# Patient Record
Sex: FEMALE | Race: WHITE | NOT HISPANIC OR LATINO | Employment: FULL TIME | URBAN - METROPOLITAN AREA
[De-identification: names, ages, dates, MRNs, and addresses within clinical notes are randomized per-mention and may not be internally consistent; named-entity substitution may affect disease eponyms.]

---

## 2024-04-29 ENCOUNTER — OFFICE VISIT (OUTPATIENT)
Dept: INTERNAL MEDICINE | Age: 39
End: 2024-04-29
Payer: COMMERCIAL

## 2024-04-29 VITALS
WEIGHT: 227.8 LBS | SYSTOLIC BLOOD PRESSURE: 126 MMHG | DIASTOLIC BLOOD PRESSURE: 78 MMHG | BODY MASS INDEX: 36.61 KG/M2 | TEMPERATURE: 98.1 F | HEIGHT: 66 IN | OXYGEN SATURATION: 98 % | HEART RATE: 89 BPM

## 2024-04-29 DIAGNOSIS — A60.00 GENITAL HERPES SIMPLEX, UNSPECIFIED SITE: ICD-10-CM

## 2024-04-29 DIAGNOSIS — Z00.00 ENCOUNTER FOR ANNUAL PHYSICAL EXAM: Primary | ICD-10-CM

## 2024-04-29 DIAGNOSIS — Z11.59 NEED FOR HEPATITIS C SCREENING TEST: ICD-10-CM

## 2024-04-29 PROCEDURE — 99385 PREV VISIT NEW AGE 18-39: CPT | Performed by: NURSE PRACTITIONER

## 2024-04-29 RX ORDER — VALACYCLOVIR HYDROCHLORIDE 500 MG/1
TABLET, FILM COATED ORAL
Qty: 30 TABLET | Refills: 2 | Status: SHIPPED | OUTPATIENT
Start: 2024-04-29

## 2024-04-29 NOTE — PROGRESS NOTES
"[facility] [place]  JOZEF Block      Danielle Larry / 39 y.o. / female  2024      VITALS     Visit Vitals  /78 (BP Location: Left arm, Patient Position: Sitting, Cuff Size: Adult)   Pulse 89   Temp 98.1 °F (36.7 °C) (Temporal)   Ht 167.6 cm (66\")   Wt 103 kg (227 lb 12.8 oz)   LMP 2024 (Approximate)   SpO2 98%   BMI 36.77 kg/m²       BP Readings from Last 3 Encounters:   24 126/78     Wt Readings from Last 3 Encounters:   24 103 kg (227 lb 12.8 oz)      Body mass index is 36.77 kg/m².    MEDICATIONS     Current Outpatient Medications   Medication Sig Dispense Refill    valACYclovir (Valtrex) 500 MG tablet 500 mg twice daily for 3 days for herpes outbreak 30 tablet 2     No current facility-administered medications for this visit.       _____________________________________________________________________________________    CHIEF COMPLAINT     Annual Exam, SKIN TAGS (MOLES), and genital herpes       HISTORY OF PRESENT ILLNESS     Danielle presents for annual health maintenance visit.    Last health maintenance visit: many years ago  General health: good  Lifestyle:  Attempting to lose weight?: Yes   Diet: eats moderately healthy  Exercise: generally stays active (work/exercise)  Tobacco: Never used   Alcohol: occasional/infrequent  Work: Full-time  Reproductive health:  Sexually active?: Yes   Sexual problems?: No problems  Concern for STD?: No    Sees Gynecologist?: No; plans to schedule   Alyssa/Postmenopausal?: No   Domestic abuse concerns: No   Depression Screenin/29/2024    11:17 AM   PHQ-2/PHQ-9 Depression Screening   Little Interest or Pleasure in Doing Things 0-->not at all   Feeling Down, Depressed or Hopeless 0-->not at all   PHQ-9: Brief Depression Severity Measure Score 0         PHQ-2: 0 (Not depressed)   PHQ-9: 0 (Negative screening for depression)    Patient Care Team:  AvilaNick DNP, APRN as PCP - General (Internal Medicine)  Provider, No Known as " PCP - Family Medicine  ______________________________________________________________________    ALLERGIES  Allergies   Allergen Reactions    Penicillins Unknown - Low Severity    Neomycin-Bacitracin Zn-Polymyx Swelling        PFSH:     The following portions of the patient's history were reviewed and updated as appropriate: Allergies / Current Medications / Past Medical History / Surgical History / Social History / Family History    PROBLEM LIST   Patient Active Problem List   Diagnosis    Genital herpes       PAST MEDICAL HISTORY  History reviewed. No pertinent past medical history.    SURGICAL HISTORY  History reviewed. No pertinent surgical history.    SOCIAL HISTORY  Social History     Socioeconomic History    Marital status: Single   Tobacco Use    Smoking status: Never    Smokeless tobacco: Never   Vaping Use    Vaping status: Never Used   Substance and Sexual Activity    Alcohol use: Yes     Alcohol/week: 2.0 standard drinks of alcohol     Types: 2 Glasses of wine per week    Drug use: Never    Sexual activity: Yes     Partners: Female       FAMILY HISTORY  Family History   Problem Relation Age of Onset    Lung cancer Mother     Prostate cancer Father     Alzheimer's disease Maternal Grandfather     Diabetes Maternal Grandfather        IMMUNIZATION HISTORY  Immunization History   Administered Date(s) Administered    COVID-19 (PFIZER) Purple Cap Monovalent 04/29/2021, 05/19/2021       ______________________________________________________________________    REVIEW OF SYSTEMS     Review of Systems   Constitutional: Negative.    HENT: Negative.     Eyes: Negative.    Respiratory: Negative.     Cardiovascular: Negative.    Gastrointestinal: Negative.    Endocrine: Negative.    Genitourinary: Negative.    Musculoskeletal: Negative.    Skin:         Skin tags    Allergic/Immunologic: Negative.    Neurological: Negative.    Hematological: Negative.    Psychiatric/Behavioral: Negative.       PHYSICAL EXAMINATION  "    Physical Exam  Constitutional:       Appearance: Normal appearance.   HENT:      Head: Normocephalic and atraumatic.      Right Ear: Tympanic membrane, ear canal and external ear normal.      Left Ear: Tympanic membrane, ear canal and external ear normal.      Nose: Nose normal.      Mouth/Throat:      Mouth: Mucous membranes are moist.      Pharynx: Oropharynx is clear.   Eyes:      Pupils: Pupils are equal, round, and reactive to light.   Neck:      Thyroid: No thyromegaly.      Vascular: No carotid bruit.   Cardiovascular:      Rate and Rhythm: Normal rate and regular rhythm.      Pulses: Normal pulses.      Heart sounds: Normal heart sounds.   Pulmonary:      Effort: Pulmonary effort is normal.      Breath sounds: Normal breath sounds.   Abdominal:      General: There is no distension.      Palpations: Abdomen is soft.      Tenderness: There is no abdominal tenderness. There is no right CVA tenderness, left CVA tenderness or guarding.   Genitourinary:     Comments: Scheduling with obgyn   Musculoskeletal:         General: Normal range of motion.      Cervical back: Normal range of motion.      Right lower leg: No edema.      Left lower leg: No edema.   Lymphadenopathy:      Cervical: No cervical adenopathy.   Skin:     General: Skin is warm and dry.   Neurological:      Mental Status: She is alert and oriented to person, place, and time.      Cranial Nerves: No cranial nerve deficit.      Motor: No weakness.      Gait: Gait normal.      Deep Tendon Reflexes:      Reflex Scores:       Patellar reflexes are 2+ on the right side and 2+ on the left side.  Psychiatric:         Mood and Affect: Mood normal.         Behavior: Behavior normal.         Thought Content: Thought content normal.         Judgment: Judgment normal.       REVIEWED DATA      Labs:    No results found for: \"NA\", \"K\", \"CALCIUM\", \"AST\", \"ALT\", \"BUN\", \"CREATININE\", \"EGFRIFNONA\", \"EGFRIFAFRI\"    No results found for: \"GLU\", \"HGBA1C\", \"TSH\", " "\"FREET4\"    No results found for: \"LDL\", \"HDL\", \"TRIG\", \"CHOLHDLRATIO\"    No results found for: \"RMMT65NU\"     No results found for: \"WBC\", \"HGB\", \"MCV\", \"PLT\"    Lab Results   Component Value Date    GLUCOSEU Negative 01/24/2016    BLOODU 2+ (A) 01/24/2016    NITRITEU Positive (A) 01/24/2016    LEUKOCYTESUR 3+ (A) 01/24/2016        No results found for: \"HEPCVIRUSABY\"    Imaging:        Medical Tests:        ASSESSMENT & PLAN     ANNUAL WELLNESS EXAM / PHYSICAL     Other medical problems addressed today:  Patient presents to establish care with new provider in office, has not been to a provider plans to establish with OB/GYN that wife also sees for mammogram that will be due next year along with Pap smear.  Works at [employer].    No family history of colon cancer.    Genital herpes with intermittent outbreaks.     Summary/Discussion:     Will get annual labs today  Valtrex as needed for genital herpes  Plans to establish with OB/GYN    Next Appointment with me: Visit date not found    Return in about 1 year (around 4/29/2025) for Annual physical.      HEALTHCARE MAINTENANCE ISSUES     Cancer Screening:  Colon: Initial/Next screening at age: 45  Repeat colon cancer screening: N/A at this time  Breast: Recommended monthly self exams; annual professional exam  Mammogram: N/A at this time  Cervical: Scheduling with OB/GYN  Skin: Monthly self skin examination, annual exam by health professional  Lung: Does not meet criteria for lung cancer screening.   Other:    Screening Labs & Tests:  Lab results reviewed & discussed with the patient or test orders placed today.  EKG:  CV Screening: Lipid panel  DEXA (65+ or postmenopausal with risk factors):   HEP C (If born [birth year], or risk factors): Ordered  Other:     Immunization/Vaccinations (to be given today unless deferred by patient)  Influenza: Patient missed the flu shot but plans to get it this season  Hepatitis A: Recommended here or at pharmacy  Tetanus/Pertussis: " Declined by patient  Pneumovax: Not needed at this time  Shingles: Not needed at this time  Other:     Lifestyle Counseling:  Lifestyle Modifications: Class 2 Severe Obesity (BMI >=35 and <=39.9). Obesity-related health conditions include the following: none. Obesity is unchanged. BMI is is above average; BMI management plan is completed. We discussed portion control and increasing exercise.   Safety Issues: Always wear seatbelt, Avoid texting while driving   Use sunscreen, regular skin examination  Recommended annual dental/vision examination.  Emotional/Stress/Sleep: Reviewed and  given when appropriate      Health Maintenance   Topic Date Due    BMI FOLLOWUP  Never done    COVID-19 Vaccine (3 - 2023-24 season) 09/01/2023    HEPATITIS C SCREENING  Never done    PAP SMEAR  Never done    TDAP/TD VACCINES (1 - Tdap) 04/29/2024 (Originally 4/18/2004)    INFLUENZA VACCINE  08/01/2024    ANNUAL PHYSICAL  04/29/2025    Pneumococcal Vaccine 0-64  Aged Out

## 2024-05-03 LAB
ALBUMIN SERPL-MCNC: 4.3 G/DL (ref 3.5–5.2)
ALBUMIN/GLOB SERPL: 1.6 G/DL
ALP SERPL-CCNC: 72 U/L (ref 39–117)
ALT SERPL-CCNC: 23 U/L (ref 1–33)
APPEARANCE UR: CLEAR
AST SERPL-CCNC: 18 U/L (ref 1–32)
BACTERIA #/AREA URNS HPF: ABNORMAL /[HPF]
BACTERIA UR CULT: ABNORMAL
BASOPHILS # BLD AUTO: 0.06 10*3/MM3 (ref 0–0.2)
BASOPHILS NFR BLD AUTO: 0.8 % (ref 0–1.5)
BILIRUB SERPL-MCNC: 0.4 MG/DL (ref 0–1.2)
BILIRUB UR QL STRIP: NEGATIVE
BUN SERPL-MCNC: 14 MG/DL (ref 6–20)
BUN/CREAT SERPL: 16.1 (ref 7–25)
CALCIUM SERPL-MCNC: 9.3 MG/DL (ref 8.6–10.5)
CASTS URNS QL MICRO: ABNORMAL /LPF
CHLORIDE SERPL-SCNC: 105 MMOL/L (ref 98–107)
CHOLEST SERPL-MCNC: 170 MG/DL (ref 0–200)
CHOLEST/HDLC SERPL: 2.7 {RATIO}
CO2 SERPL-SCNC: 24.8 MMOL/L (ref 22–29)
COLOR UR: YELLOW
CREAT SERPL-MCNC: 0.87 MG/DL (ref 0.57–1)
EGFRCR SERPLBLD CKD-EPI 2021: 87 ML/MIN/1.73
EOSINOPHIL # BLD AUTO: 0.46 10*3/MM3 (ref 0–0.4)
EOSINOPHIL NFR BLD AUTO: 6.2 % (ref 0.3–6.2)
EPI CELLS #/AREA URNS HPF: ABNORMAL /HPF (ref 0–10)
ERYTHROCYTE [DISTWIDTH] IN BLOOD BY AUTOMATED COUNT: 12.3 % (ref 12.3–15.4)
GLOBULIN SER CALC-MCNC: 2.7 GM/DL
GLUCOSE SERPL-MCNC: 91 MG/DL (ref 65–99)
GLUCOSE UR QL STRIP: NEGATIVE
HBA1C MFR BLD: 5.3 % (ref 4.8–5.6)
HCT VFR BLD AUTO: 41.4 % (ref 34–46.6)
HCV IGG SERPL QL IA: NON REACTIVE
HDLC SERPL-MCNC: 63 MG/DL (ref 40–60)
HGB BLD-MCNC: 13.8 G/DL (ref 12–15.9)
HGB UR QL STRIP: NEGATIVE
IMM GRANULOCYTES # BLD AUTO: 0.04 10*3/MM3 (ref 0–0.05)
IMM GRANULOCYTES NFR BLD AUTO: 0.5 % (ref 0–0.5)
KETONES UR QL STRIP: NEGATIVE
LDLC SERPL CALC-MCNC: 82 MG/DL (ref 0–100)
LEUKOCYTE ESTERASE UR QL STRIP: ABNORMAL
LYMPHOCYTES # BLD AUTO: 2.15 10*3/MM3 (ref 0.7–3.1)
LYMPHOCYTES NFR BLD AUTO: 28.9 % (ref 19.6–45.3)
MCH RBC QN AUTO: 30.2 PG (ref 26.6–33)
MCHC RBC AUTO-ENTMCNC: 33.3 G/DL (ref 31.5–35.7)
MCV RBC AUTO: 90.6 FL (ref 79–97)
MICRO URNS: ABNORMAL
MONOCYTES # BLD AUTO: 0.59 10*3/MM3 (ref 0.1–0.9)
MONOCYTES NFR BLD AUTO: 7.9 % (ref 5–12)
NEUTROPHILS # BLD AUTO: 4.15 10*3/MM3 (ref 1.7–7)
NEUTROPHILS NFR BLD AUTO: 55.7 % (ref 42.7–76)
NITRITE UR QL STRIP: NEGATIVE
NRBC BLD AUTO-RTO: 0 /100 WBC (ref 0–0.2)
OTHER ANTIBIOTIC SUSC ISLT: ABNORMAL
PH UR STRIP: 7 [PH] (ref 5–7.5)
PLATELET # BLD AUTO: 333 10*3/MM3 (ref 140–450)
POTASSIUM SERPL-SCNC: 4.9 MMOL/L (ref 3.5–5.2)
PROT SERPL-MCNC: 7 G/DL (ref 6–8.5)
PROT UR QL STRIP: NEGATIVE
RBC # BLD AUTO: 4.57 10*6/MM3 (ref 3.77–5.28)
RBC #/AREA URNS HPF: ABNORMAL /HPF (ref 0–2)
SODIUM SERPL-SCNC: 141 MMOL/L (ref 136–145)
SP GR UR STRIP: 1.02 (ref 1–1.03)
T4 FREE SERPL-MCNC: 1.15 NG/DL (ref 0.93–1.7)
TRIGL SERPL-MCNC: 148 MG/DL (ref 0–150)
TSH SERPL DL<=0.005 MIU/L-ACNC: 1.16 UIU/ML (ref 0.27–4.2)
URINALYSIS REFLEX: ABNORMAL
UROBILINOGEN UR STRIP-MCNC: 0.2 MG/DL (ref 0.2–1)
VLDLC SERPL CALC-MCNC: 25 MG/DL (ref 5–40)
WBC # BLD AUTO: 7.45 10*3/MM3 (ref 3.4–10.8)
WBC #/AREA URNS HPF: ABNORMAL /HPF (ref 0–5)

## 2025-03-31 ENCOUNTER — OFFICE VISIT (OUTPATIENT)
Dept: INTERNAL MEDICINE | Age: 40
End: 2025-03-31
Payer: COMMERCIAL

## 2025-03-31 VITALS
OXYGEN SATURATION: 97 % | BODY MASS INDEX: 35.23 KG/M2 | TEMPERATURE: 96.6 F | HEIGHT: 66 IN | SYSTOLIC BLOOD PRESSURE: 122 MMHG | DIASTOLIC BLOOD PRESSURE: 74 MMHG | HEART RATE: 68 BPM | WEIGHT: 219.2 LBS | RESPIRATION RATE: 14 BRPM

## 2025-03-31 DIAGNOSIS — Z23 NEED FOR TETANUS BOOSTER: ICD-10-CM

## 2025-03-31 DIAGNOSIS — L98.9 SKIN LESIONS: ICD-10-CM

## 2025-03-31 DIAGNOSIS — Z12.4 SCREENING FOR CERVICAL CANCER: Primary | ICD-10-CM

## 2025-03-31 DIAGNOSIS — I83.93 VARICOSE VEINS OF BOTH LOWER EXTREMITIES, UNSPECIFIED WHETHER COMPLICATED: ICD-10-CM

## 2025-03-31 NOTE — PROGRESS NOTES
"    I N T E R N A L  M E D I C I N E  JAKE RM, APRN      ENCOUNTER DATE:  03/31/2025    Danielle Juarez / 39 y.o. / female      CHIEF COMPLAINT / REASON FOR OFFICE VISIT     Varicose Veins      ASSESSMENT & PLAN     Problem List Items Addressed This Visit    None  Visit Diagnoses         Screening for cervical cancer    -  Primary    Relevant Orders    Ambulatory Referral to Obstetrics / Gynecology      Skin lesions        Relevant Orders    Ambulatory Referral to Dermatology (Completed)      Varicose veins of both lower extremities, unspecified whether complicated          Need for tetanus booster        Relevant Orders    Tdap Vaccine => 6yo IM (BOOSTRIX/ADACEL) (Completed)          Orders Placed This Encounter   Procedures    Tdap Vaccine => 6yo IM (BOOSTRIX/ADACEL)    Ambulatory Referral to Obstetrics / Gynecology    Ambulatory Referral to Dermatology     No orders of the defined types were placed in this encounter.    SUMMARY/DISCUSSION      Next Appointment with me: 5/6/2025    Return for cancel may 1 and schedule may 6 physical slot .      VITAL SIGNS     Visit Vitals  /74   Pulse 68   Temp 96.6 °F (35.9 °C) (Temporal)   Resp 14   Ht 167.6 cm (66\")   Wt 99.4 kg (219 lb 3.2 oz)   LMP 03/26/2025 (Approximate)   SpO2 97%   BMI 35.38 kg/m²       Wt Readings from Last 3 Encounters:   03/31/25 99.4 kg (219 lb 3.2 oz)   04/29/24 103 kg (227 lb 12.8 oz)     Body mass index is 35.38 kg/m².      MEDICATIONS AT THE TIME OF OFFICE VISIT     Current Outpatient Medications on File Prior to Visit   Medication Sig    valACYclovir (Valtrex) 500 MG tablet 500 mg twice daily for 3 days for herpes outbreak     No current facility-administered medications on file prior to visit.          HISTORY OF PRESENT ILLNESS     Multiple skin lesions and moles, cyst on the left ring finger, would like referral to dermatology.  Not appear to be melanoma    Overdue for well woman exam    Multiple varicose veins, no tenderness or " swelling at this time.    REVIEW OF SYSTEMS     Constitutional neg except per HPI   Resp neg  CV neg   Musc varicose veins  Skin multiple moles    PHYSICAL EXAMINATION     Physical Exam  Constitutional  No distress  Cardiovascular Rate  normal . Rhythm: regular . Heart sounds:  normal  Pulmonary/Chest  Effort normal. Breath sounds:  normal  Psychiatric  Alert. Judgment and thought content normal. Mood normal      REVIEWED DATA     Labs:   Lab Results   Component Value Date    GLUCOSE 91 04/29/2024    BUN 14 04/29/2024    CREATININE 0.87 04/29/2024    EGFR 87.0 04/29/2024    BCR 16.1 04/29/2024    K 4.9 04/29/2024    CO2 24.8 04/29/2024    CALCIUM 9.3 04/29/2024    ALBUMIN 4.3 04/29/2024    BILITOT 0.4 04/29/2024    AST 18 04/29/2024    ALT 23 04/29/2024     Lab Results   Component Value Date    WBC 7.45 04/29/2024    HGB 13.8 04/29/2024    HCT 41.4 04/29/2024    MCV 90.6 04/29/2024     04/29/2024     Lab Results   Component Value Date    CHLPL 170 04/29/2024    TRIG 148 04/29/2024    HDL 63 (H) 04/29/2024    LDL 82 04/29/2024      Lab Results   Component Value Date    TSH 1.160 04/29/2024     Brief Urine Lab Results  (Last result in the past 365 days)        Color   Clarity   Blood   Leuk Est   Nitrite   Protein   CREAT   Urine HCG        04/29/24 1202 Yellow   Clear   Negative   2+   Negative   Negative                 Lab Results   Component Value Date    HGBA1C 5.30 04/29/2024       Imaging:           Medical Tests:           Summary of old records / correspondence / consultant report:           Request outside records:

## 2025-03-31 NOTE — LETTER
[facility]  Vaccine Consent Form    Patient Name:  Danielle Juarez  Patient :  1985     Vaccine(s) Ordered    Tdap Vaccine => 8yo IM (BOOSTRIX/ADACEL)        Screening Checklist  The following questions should be completed prior to vaccination. If you answer “yes” to any question, it does not necessarily mean you should not be vaccinated. It just means we may need to clarify or ask more questions. If a question is unclear, please ask your healthcare provider to explain it.    Yes No   Any fever or moderate to severe illness today (mild illness and/or antibiotic treatment are not contraindications)?     Do you have a history of a serious reaction to any previous vaccinations, such as anaphylaxis, encephalopathy within 7 days, Guillain-Bellmawr syndrome within 6 weeks, seizure?     Have you received any live vaccine(s) (e.g MMR, TAMMY) or any other vaccines in the last month (to ensure duplicate doses aren't given)?     Do you have an anaphylactic allergy to latex (DTaP, DTaP-IPV, Hep A, Hep B, MenB, RV, Td, Tdap), baker’s yeast (Hep B, HPV), polysorbates (RSV, nirsevimab, PCV 20, Rotavirrus, Tdap, Shingrix), or gelatin (TAMMY, MMR)?     Do you have an anaphylactic allergy to neomycin (Rabies, TAMMY, MMR, IPV, Hep A), polymyxin B (IPV), or streptomycin (IPV)?      Any cancer, leukemia, AIDS, or other immune system disorder? (TAMMY, MMR, RV)     Do you have a parent, brother, or sister with an immune system problem (if immune competence of vaccine recipient clinically verified, can proceed)? (MMR, TAMMY)     Any recent steroid treatments for >2 weeks, chemotherapy, or radiation treatment? (TAMMY, MMR)     Have you received antibody-containing blood transfusions or IVIG in the past 11 months (recommended interval is dependent on product)? (MMR, TAMMY)     Have you taken antiviral drugs (acyclovir, famciclovir, valacyclovir for TAMMY) in the last 24 or 48 hours, respectively?      Are you pregnant or planning to become pregnant  "within 1 month? (TAMMY, MMR, HPV, IPV, MenB, Abrexvy; For Hep B- refer to Engerix-B; For RSV - Abrysvo is indicated for 32-36 weeks of pregnancy from September to January)     For infants, have you ever been told your child has had intussusception or a medical emergency involving obstruction of the intestine (Rotavirus)? If not for an infant, can skip this question.         *Ordering Physicians/APC should be consulted if \"yes\" is checked by the patient or guardian above.  I have received, read, and understand the Vaccine Information Statement (VIS) for each vaccine ordered.  I have considered my or my child's health status as well as the health status of my close contacts.  I have taken the opportunity to discuss my vaccine questions with my or my child's health care provider.   I have requested that the ordered vaccine(s) be given to me or my child.  I understand the benefits and risks of the vaccines.  I understand that I should remain in the clinic for 15 minutes after receiving the vaccine(s).  _________________________________________________________  Signature of Patient or Parent/Legal Guardian ____________________  Date   "

## 2025-05-06 ENCOUNTER — OFFICE VISIT (OUTPATIENT)
Dept: INTERNAL MEDICINE | Age: 40
End: 2025-05-06
Payer: COMMERCIAL

## 2025-05-06 VITALS
TEMPERATURE: 97.3 F | RESPIRATION RATE: 14 BRPM | HEIGHT: 66 IN | DIASTOLIC BLOOD PRESSURE: 72 MMHG | WEIGHT: 223 LBS | BODY MASS INDEX: 35.84 KG/M2 | SYSTOLIC BLOOD PRESSURE: 120 MMHG | OXYGEN SATURATION: 98 % | HEART RATE: 86 BPM

## 2025-05-06 DIAGNOSIS — Z00.00 ENCOUNTER FOR ANNUAL PHYSICAL EXAM: Primary | ICD-10-CM

## 2025-05-06 PROCEDURE — 99396 PREV VISIT EST AGE 40-64: CPT | Performed by: NURSE PRACTITIONER

## 2025-05-06 NOTE — PROGRESS NOTES
"                     S K Y L E R    F R Y E ,   D N P ,  A P R N                  I  N  T  E  R  N  A  L    M  E  D  I  C  I  N  E       ENCOUNTER DATE:  05/06/2025    Danielle Larry / 40 y.o. / female    ANNUAL PREVENTATIVE / PHYSICAL ENCOUNTER       CHIEF COMPLAINT     Annual Exam      VITALS     Vitals:    05/06/25 1538   BP: 120/72   Pulse: 86   Resp: 14   Temp: 97.3 °F (36.3 °C)   TempSrc: Temporal   SpO2: 98%   Weight: 101 kg (223 lb)   Height: 167.6 cm (66\")       BP Readings from Last 3 Encounters:   05/06/25 120/72   03/31/25 122/74   04/29/24 126/78     Wt Readings from Last 3 Encounters:   05/06/25 101 kg (223 lb)   03/31/25 99.4 kg (219 lb 3.2 oz)   04/29/24 103 kg (227 lb 12.8 oz)      Body mass index is 35.99 kg/m².      MEDICATIONS     Current Outpatient Medications on File Prior to Visit   Medication Sig Dispense Refill    valACYclovir (Valtrex) 500 MG tablet 500 mg twice daily for 3 days for herpes outbreak 30 tablet 2     No current facility-administered medications on file prior to visit.         HISTORY OF PRESENT ILLNESS     Danielle presents for annual health maintenance visit.    Patient is now wearing compression hose daily, has not worn them for 1 month yet would like to see progress before proceeding with further intervention.      Now scheduled with OB/GYN in June, would like to get mammogram ordered with their care team so she can do it here at [facility]    Patient Care Team:  Nick Avila, TE, APRN as PCP - General (Internal Medicine)  Mary Copeland MD as Consulting Physician (Obstetrics and Gynecology)    General health: good  Lifestyle:  Attempting to lose weight?: Yes   Diet: eats moderately healthy  Exercise: generally stays active (but no regular exercise)  Tobacco: Never used   Alcohol: occasional/infrequent  Work: Full-time  Reproductive health:  Sexually active?: Yes   Sexual problems?: No problems  Concern for STD?:  OkayNo    Sees Gynecologist?: No "   Alyssa/Postmenopausal?: No   Depression Screening:        PHQ-2 Depression Screening  Little interest or pleasure in doing things?     Feeling down, depressed, or hopeless?     PHQ-2 Total Score          PHQ-2: 0 (Not depressed)   PHQ-9: 0 (Negative screening for depression)    ALLERGIES  Allergies   Allergen Reactions    Penicillins Unknown - Low Severity    Neomycin-Bacitracin Zn-Polymyx Swelling        PFSH:     The following portions of the patient's history were reviewed and updated as appropriate: Allergies / Current Medications / Past Medical History / Surgical History / Social History / Family History    PROBLEM LIST   Patient Active Problem List   Diagnosis    Genital herpes       PAST MEDICAL HISTORY  History reviewed. No pertinent past medical history.    SURGICAL HISTORY  History reviewed. No pertinent surgical history.    SOCIAL HISTORY  Social History     Socioeconomic History    Marital status:    Tobacco Use    Smoking status: Never     Passive exposure: Never    Smokeless tobacco: Never   Vaping Use    Vaping status: Never Used   Substance and Sexual Activity    Alcohol use: Yes     Alcohol/week: 2.0 standard drinks of alcohol     Types: 2 Glasses of wine per week    Drug use: Never    Sexual activity: Yes     Partners: Female       FAMILY HISTORY  Family History   Problem Relation Age of Onset    Lung cancer Mother     Prostate cancer Father     Alzheimer's disease Maternal Grandfather     Diabetes Maternal Grandfather     Prostate cancer Paternal Uncle     Breast cancer Paternal Aunt        IMMUNIZATION HISTORY  Immunization History   Administered Date(s) Administered    COVID-19 (PFIZER) Purple Cap Monovalent 04/29/2021, 05/19/2021    Tdap 03/31/2025         REVIEW OF SYSTEMS     Review of Systems   Constitutional: Negative.    HENT: Negative.     Eyes: Negative.    Respiratory: Negative.     Cardiovascular: Negative.    Gastrointestinal: Negative.    Endocrine: Negative.    Genitourinary:  Negative.    Musculoskeletal: Negative.    Skin: Negative.    Allergic/Immunologic: Negative.    Neurological: Negative.    Hematological: Negative.    Psychiatric/Behavioral: Negative.       PHYSICAL EXAMINATION     Physical Exam  Constitutional:       Appearance: Normal appearance.   HENT:      Head: Normocephalic and atraumatic.      Right Ear: Tympanic membrane, ear canal and external ear normal.      Left Ear: Tympanic membrane, ear canal and external ear normal.      Nose: Nose normal. No congestion.      Mouth/Throat:      Mouth: Mucous membranes are moist.      Pharynx: Oropharynx is clear.   Eyes:      Conjunctiva/sclera: Conjunctivae normal.      Pupils: Pupils are equal, round, and reactive to light.   Neck:      Thyroid: No thyromegaly.      Vascular: No carotid bruit.   Cardiovascular:      Rate and Rhythm: Normal rate and regular rhythm.      Pulses: Normal pulses.      Heart sounds: Normal heart sounds.   Pulmonary:      Effort: Pulmonary effort is normal.   Abdominal:      General: There is no distension.      Palpations: Abdomen is soft.      Tenderness: There is no abdominal tenderness. There is no right CVA tenderness, left CVA tenderness or guarding.   Genitourinary:     Comments: Followed by OB/GYN  Musculoskeletal:         General: Normal range of motion.      Cervical back: Normal range of motion.      Right lower leg: No edema.      Left lower leg: No edema.   Lymphadenopathy:      Cervical: No cervical adenopathy.   Skin:     General: Skin is warm and dry.      Comments: Varicose veins lower extremity bilateral   Neurological:      Mental Status: She is alert and oriented to person, place, and time.      Cranial Nerves: No cranial nerve deficit.      Motor: No weakness.      Gait: Gait normal.   Psychiatric:         Mood and Affect: Mood normal.         Behavior: Behavior normal.         Thought Content: Thought content normal.         Judgment: Judgment normal.         REVIEWED DATA   "    Labs:    Lab Results   Component Value Date     04/29/2024    K 4.9 04/29/2024    CALCIUM 9.3 04/29/2024    AST 18 04/29/2024    ALT 23 04/29/2024    BUN 14 04/29/2024    CREATININE 0.87 04/29/2024     Lab Results   Component Value Date    GLUCOSE 91 04/29/2024    HGBA1C 5.30 04/29/2024    TSH 1.160 04/29/2024    FREET4 1.15 04/29/2024     Lab Results   Component Value Date    LDL 82 04/29/2024    HDL 63 (H) 04/29/2024    TRIG 148 04/29/2024    CHOLHDLRATIO 2.70 04/29/2024   No results found for: \"RRCA40XV\"   Lab Results   Component Value Date    WBC 7.45 04/29/2024    HGB 13.8 04/29/2024    MCV 90.6 04/29/2024     04/29/2024     Lab Results   Component Value Date    PROTEIN Negative 04/29/2024    GLUCOSEU Negative 04/29/2024    BLOODU Negative 04/29/2024    NITRITEU Negative 04/29/2024    LEUKOCYTESUR 2+ (A) 04/29/2024     Lab Results   Component Value Date    HEPCVIRUSABY Non Reactive 04/29/2024       Imaging:                Medical Tests:              Summary of old records / correspondence / consultant report:               Request outside records:         ASSESSMENT & PLAN     ANNUAL WELLNESS EXAM / PHYSICAL     Other medical problems addressed today:  Problem List Items Addressed This Visit    None  Visit Diagnoses         Encounter for screening mammogram for malignant neoplasm of breast    -  Primary      Encounter for annual physical exam        Relevant Orders    Comprehensive Metabolic Panel    Lipid Panel With / Chol / HDL Ratio    CBC & Differential    Hemoglobin A1c    TSH+Free T4    UA / M With / Rflx Culture([facility] ONLY) - Urine, Clean Catch             Summary/Discussion:     Annual fasting labs today, does not need refill of Valtrex at this time.  OB/GYN will obtain mammogram and Pap smear at that time in June.    Next Appointment with me: Visit date not found    No follow-ups on file.      HEALTHCARE MAINTENANCE ISSUES     Cancer Screening:  Colon: Initial/Next screening at age: " 45  Repeat colon cancer screening: N/A at this time  Breast: Recommended monthly self exams; annual professional exam  Mammogram: every 1 year  Cervical: pelvic exam as recommended by gyne  Skin: Monthly self skin examination, annual exam by health professional  Lung: Does not meet criteria for lung cancer screening.   Other:    Screening Labs & Tests:  Lab results reviewed & discussed with the patient or test orders placed today.  EKG:  CV Screening: Lipid panel  DEXA (65+ or postmenopausal with risk factors):   HEP C (If born [birth year], or risk factors): Negative screen  Other:     Immunization/Vaccinations (to be given today unless deferred by patient)  Influenza: Patient deferred/declined flu vaccine (recommended annual vaccination)  Hepatitis A: Verify immunization records  Hepatitis B: Verify immunization records  Tetanus/Pertussis: Up to date  Pneumococcal: Not needed at this time  Shingles: Not needed at this time  COVID: Does not plan to get the latest booster  RSV: Not indicated    Lifestyle Counseling:  Lifestyle Modifications: Improve dietary compliance  Safety Issues: Always wear seatbelt, Avoid texting while driving   Use sunscreen, regular skin examination  Recommended annual dental/vision examination.  Emotional/Stress/Sleep: Reviewed and  given when appropriate      Health Maintenance   Topic Date Due    MAMMOGRAM  04/18/2025    COVID-19 Vaccine (3 - 2024-25 season) 05/20/2025 (Originally 9/1/2024)    PAP SMEAR  06/28/2025 (Originally 4/18/2006)    INFLUENZA VACCINE  07/01/2025    ANNUAL PHYSICAL  05/06/2026    TDAP/TD VACCINES (2 - Td or Tdap) 03/31/2035    HEPATITIS C SCREENING  Completed    Pneumococcal Vaccine 0-49  Aged Out

## 2025-05-07 LAB
ALBUMIN SERPL-MCNC: 4.7 G/DL (ref 3.5–5.2)
ALBUMIN/GLOB SERPL: 1.6 G/DL
ALP SERPL-CCNC: 71 U/L (ref 39–117)
ALT SERPL-CCNC: 21 U/L (ref 1–33)
APPEARANCE UR: CLEAR
AST SERPL-CCNC: 21 U/L (ref 1–32)
BACTERIA #/AREA URNS HPF: ABNORMAL /[HPF]
BASOPHILS # BLD AUTO: 0.07 10*3/MM3 (ref 0–0.2)
BASOPHILS NFR BLD AUTO: 0.8 % (ref 0–1.5)
BILIRUB SERPL-MCNC: 0.3 MG/DL (ref 0–1.2)
BILIRUB UR QL STRIP: NEGATIVE
BUN SERPL-MCNC: 17 MG/DL (ref 6–20)
BUN/CREAT SERPL: 21.5 (ref 7–25)
CALCIUM SERPL-MCNC: 9.6 MG/DL (ref 8.6–10.5)
CASTS URNS QL MICRO: ABNORMAL /LPF
CHLORIDE SERPL-SCNC: 104 MMOL/L (ref 98–107)
CHOLEST SERPL-MCNC: 176 MG/DL (ref 0–200)
CHOLEST/HDLC SERPL: 2.48 {RATIO}
CO2 SERPL-SCNC: 27.1 MMOL/L (ref 22–29)
COLOR UR: YELLOW
CREAT SERPL-MCNC: 0.79 MG/DL (ref 0.57–1)
EGFRCR SERPLBLD CKD-EPI 2021: 97.1 ML/MIN/1.73
EOSINOPHIL # BLD AUTO: 0.28 10*3/MM3 (ref 0–0.4)
EOSINOPHIL NFR BLD AUTO: 3 % (ref 0.3–6.2)
EPI CELLS #/AREA URNS HPF: >10 /HPF (ref 0–10)
ERYTHROCYTE [DISTWIDTH] IN BLOOD BY AUTOMATED COUNT: 12.7 % (ref 12.3–15.4)
GLOBULIN SER CALC-MCNC: 2.9 GM/DL
GLUCOSE SERPL-MCNC: 112 MG/DL (ref 65–99)
GLUCOSE UR QL STRIP: NEGATIVE
HBA1C MFR BLD: 5.1 % (ref 4.8–5.6)
HCT VFR BLD AUTO: 42.3 % (ref 34–46.6)
HDLC SERPL-MCNC: 71 MG/DL (ref 40–60)
HGB BLD-MCNC: 13.8 G/DL (ref 12–15.9)
HGB UR QL STRIP: NEGATIVE
IMM GRANULOCYTES # BLD AUTO: 0.03 10*3/MM3 (ref 0–0.05)
IMM GRANULOCYTES NFR BLD AUTO: 0.3 % (ref 0–0.5)
KETONES UR QL STRIP: NEGATIVE
LDLC SERPL CALC-MCNC: 62 MG/DL (ref 0–100)
LEUKOCYTE ESTERASE UR QL STRIP: NEGATIVE
LYMPHOCYTES # BLD AUTO: 2.36 10*3/MM3 (ref 0.7–3.1)
LYMPHOCYTES NFR BLD AUTO: 25.6 % (ref 19.6–45.3)
MCH RBC QN AUTO: 30.7 PG (ref 26.6–33)
MCHC RBC AUTO-ENTMCNC: 32.6 G/DL (ref 31.5–35.7)
MCV RBC AUTO: 94 FL (ref 79–97)
MICRO URNS: ABNORMAL
MICRO URNS: ABNORMAL
MONOCYTES # BLD AUTO: 0.62 10*3/MM3 (ref 0.1–0.9)
MONOCYTES NFR BLD AUTO: 6.7 % (ref 5–12)
NEUTROPHILS # BLD AUTO: 5.86 10*3/MM3 (ref 1.7–7)
NEUTROPHILS NFR BLD AUTO: 63.6 % (ref 42.7–76)
NITRITE UR QL STRIP: NEGATIVE
NRBC BLD AUTO-RTO: 0 /100 WBC (ref 0–0.2)
PH UR STRIP: 6 [PH] (ref 5–7.5)
PLATELET # BLD AUTO: 333 10*3/MM3 (ref 140–450)
POTASSIUM SERPL-SCNC: 4.1 MMOL/L (ref 3.5–5.2)
PROT SERPL-MCNC: 7.6 G/DL (ref 6–8.5)
PROT UR QL STRIP: NEGATIVE
RBC # BLD AUTO: 4.5 10*6/MM3 (ref 3.77–5.28)
RBC #/AREA URNS HPF: ABNORMAL /HPF (ref 0–2)
SODIUM SERPL-SCNC: 140 MMOL/L (ref 136–145)
SP GR UR STRIP: >=1.03 (ref 1–1.03)
T4 FREE SERPL-MCNC: 1.15 NG/DL (ref 0.92–1.68)
TRIGL SERPL-MCNC: 273 MG/DL (ref 0–150)
TSH SERPL DL<=0.005 MIU/L-ACNC: 1.56 UIU/ML (ref 0.27–4.2)
URINALYSIS REFLEX: ABNORMAL
UROBILINOGEN UR STRIP-MCNC: 0.2 MG/DL (ref 0.2–1)
VLDLC SERPL CALC-MCNC: 43 MG/DL (ref 5–40)
WBC # BLD AUTO: 9.22 10*3/MM3 (ref 3.4–10.8)
WBC #/AREA URNS HPF: ABNORMAL /HPF (ref 0–5)

## 2025-06-17 ENCOUNTER — OFFICE VISIT (OUTPATIENT)
Dept: OBSTETRICS AND GYNECOLOGY | Age: 40
End: 2025-06-17
Payer: COMMERCIAL

## 2025-06-17 VITALS — BODY MASS INDEX: 35.19 KG/M2 | DIASTOLIC BLOOD PRESSURE: 70 MMHG | SYSTOLIC BLOOD PRESSURE: 120 MMHG | WEIGHT: 218 LBS

## 2025-06-17 DIAGNOSIS — Z11.51 SCREENING FOR HUMAN PAPILLOMAVIRUS (HPV): ICD-10-CM

## 2025-06-17 DIAGNOSIS — Z01.419 WELL FEMALE EXAM WITH ROUTINE GYNECOLOGICAL EXAM: Primary | ICD-10-CM

## 2025-06-17 DIAGNOSIS — Z12.31 SCREENING MAMMOGRAM FOR BREAST CANCER: ICD-10-CM

## 2025-06-17 DIAGNOSIS — Z12.4 SCREENING FOR CERVICAL CANCER: ICD-10-CM

## 2025-06-17 NOTE — PROGRESS NOTES
GYN Annual Visit    2025    Patient: Danielle Juarez          MR#:[id number]    History of Present Illness    Chief Complaint   Patient presents with    Gynecologic Exam     New gyn  Here for annual   Last pap - on file , pt states last p[ap was in   Never had mammo  Pt states her period days are irregular        40 y.o. female  who presents for annual exam referrad by PCP Nick Avila.   Patient is doing well today without complaints.  Last pap was ~ or . No h/o abnormal paps.   Mammogram ordered and scheduled for this.   H/o HSV2- only had one outbreak in the past few years.   Periods are monthly, spotting for a couple days - heavy periods in the middle then tapers off. No significant pain.    with female partner- monogamous relationship. No pain or discomfort with intercourse.   Declines STD testing.   Breast CA in Pat aunt - >50s.     Immunization History   Administered Date(s) Administered    COVID-19 (PFIZER) Purple Cap Monovalent 2021, 2021    Tdap 2025       ____________________________________  Patient Active Problem List   Diagnosis    Genital herpes     History reviewed. No pertinent past medical history.  History reviewed. No pertinent surgical history.  Social History     Tobacco Use   Smoking Status Never    Passive exposure: Never   Smokeless Tobacco Never     Family History   Problem Relation Age of Onset    Lung cancer Mother     Prostate cancer Father     Alzheimer's disease Maternal Grandfather     Diabetes Maternal Grandfather     Prostate cancer Paternal Uncle     Breast cancer Paternal Aunt      Prior to Admission medications    Medication Sig Start Date End Date Taking? Authorizing Provider   valACYclovir (Valtrex) 500 MG tablet 500 mg twice daily for 3 days for herpes outbreak 24   Nick Avila, TE, APRN     ________________________________________    The following portions of the patient's history were reviewed and updated as  "appropriate: allergies, current medications, past family history, past medical history, past social history, past surgical history, and problem list.    Review of Systems   Constitutional:  Negative for activity change and unexpected weight change.   Respiratory:  Negative for shortness of breath.    Cardiovascular:  Negative for chest pain.   Gastrointestinal:  Negative for abdominal distention, abdominal pain, constipation and diarrhea.   Endocrine: Negative for cold intolerance and heat intolerance.   Genitourinary:  Negative for dyspareunia, dysuria, frequency, urgency and vaginal pain.   Neurological:  Negative for headaches.   Psychiatric/Behavioral: Negative.     All other systems reviewed and are negative.           Objective     /70   Wt 98.9 kg (218 lb)   LMP 06/14/2025 (Approximate)   BMI 35.19 kg/m²    BP Readings from Last 3 Encounters:   06/17/25 120/70   05/06/25 120/72   03/31/25 122/74      Wt Readings from Last 3 Encounters:   06/17/25 98.9 kg (218 lb)   05/06/25 101 kg (223 lb)   03/31/25 99.4 kg (219 lb 3.2 oz)        BMI: Estimated body mass index is 35.19 kg/m² as calculated from the following:    Height as of 5/6/25: 167.6 cm (66\").    Weight as of this encounter: 98.9 kg (218 lb).    Physical Exam  Constitutional:       General: She is not in acute distress.     Appearance: Normal appearance. She is normal weight.   Genitourinary:      Urethral meatus normal.      Vulva exam comments: Normal, no atrophy or lesions.      No vaginal discharge.      No vaginal prolapse present.     No vaginal atrophy present.     Vaginal exam comments: Well-estrogenized, no masses or lesions.      Adnexa exam comments: Normal on palpation.      Cervical exam comments: Normal appearance.      Uterus is not enlarged or tender.      No uterine mass detected.     Bladder is not tender.       Pelvic exam was performed with patient in the lithotomy position.   Rectum:      Rectal exam comments: Normal " external appearance.   Breasts:     Right: Normal.      Left: Normal.   HENT:      Head: Normocephalic and atraumatic.      Nose: Nose normal.   Eyes:      Conjunctiva/sclera: Conjunctivae normal.   Cardiovascular:      Rate and Rhythm: Normal rate and regular rhythm.   Pulmonary:      Effort: Pulmonary effort is normal.      Breath sounds: Normal breath sounds.   Abdominal:      General: Abdomen is flat. There is no distension.      Palpations: Abdomen is soft. There is no mass.      Tenderness: There is no abdominal tenderness.   Musculoskeletal:         General: Normal range of motion.      Cervical back: Normal range of motion and neck supple.   Neurological:      Mental Status: She is alert. Mental status is at baseline.   Skin:     General: Skin is warm and dry.   Psychiatric:         Mood and Affect: Mood normal.         Behavior: Behavior normal.   Vitals reviewed. Exam conducted with a chaperone present.          As part of wellness and prevention, the following topics were discussed with the patient:  Encouraged self breast exam  Physical activity and regular exercised encouraged.   Healthy weight discussed.  Nutrition discussed.  Sexual behavior/safe practices discussed.   Sexual transmitted disease prevention           Assessment:  40 y.o. female  here for annual care exam       Well female exam with routine gynecological exam  Screening for cervical cancer  Screening for human papillomavirus (HPV)  Screening mammogram for breast cancer  - Doing well today without complaints  - Exam within normal limits  - Pap smear collected  - Mammogram ordered for breast cancer screening  - GYN health maintenance counseling performed  - Return to clinic for annual care appointment in 1 year or sooner with issues.    Plan:  Return in 1 year (on 2026) for Annual exam.    Mary Copeland MD  2025 12:09 EDT

## 2025-06-19 LAB
CYTOLOGIST CVX/VAG CYTO: NORMAL
CYTOLOGY CVX/VAG DOC CYTO: NORMAL
CYTOLOGY CVX/VAG DOC THIN PREP: NORMAL
DX ICD CODE: NORMAL
HPV I/H RISK 4 DNA CVX QL PROBE+SIG AMP: NEGATIVE
OTHER STN SPEC: NORMAL
SERVICE CMNT-IMP: NORMAL
STAT OF ADQ CVX/VAG CYTO-IMP: NORMAL